# Patient Record
Sex: FEMALE | Race: WHITE | NOT HISPANIC OR LATINO | Employment: OTHER | URBAN - METROPOLITAN AREA
[De-identification: names, ages, dates, MRNs, and addresses within clinical notes are randomized per-mention and may not be internally consistent; named-entity substitution may affect disease eponyms.]

---

## 2017-03-28 DIAGNOSIS — J45.40 MODERATE PERSISTENT ASTHMA WITHOUT COMPLICATION: ICD-10-CM

## 2017-03-28 RX ORDER — FLUTICASONE PROPIONATE 220 UG/1
AEROSOL, METERED RESPIRATORY (INHALATION)
Qty: 12 G | Refills: 6 | Status: SHIPPED | OUTPATIENT
Start: 2017-03-28 | End: 2017-10-16 | Stop reason: SDUPTHER

## 2017-04-25 DIAGNOSIS — E87.6 HYPOKALEMIA: ICD-10-CM

## 2017-04-25 RX ORDER — POTASSIUM CHLORIDE 750 MG/1
CAPSULE, EXTENDED RELEASE ORAL
Qty: 60 CAPSULE | Refills: 11 | Status: SHIPPED | OUTPATIENT
Start: 2017-04-25 | End: 2018-05-14 | Stop reason: SDUPTHER

## 2017-10-16 DIAGNOSIS — J45.40 MODERATE PERSISTENT ASTHMA WITHOUT COMPLICATION: ICD-10-CM

## 2017-10-16 RX ORDER — FLUTICASONE PROPIONATE 220 UG/1
AEROSOL, METERED RESPIRATORY (INHALATION)
Qty: 12 G | Refills: 6 | Status: SHIPPED | OUTPATIENT
Start: 2017-10-16 | End: 2018-02-12

## 2018-01-05 DIAGNOSIS — R06.82 TACHYPNEA: Primary | ICD-10-CM

## 2018-01-05 DIAGNOSIS — I27.9 CHRONIC PULMONARY HEART DISEASE: ICD-10-CM

## 2018-01-05 DIAGNOSIS — Z79.899 POLYPHARMACY: ICD-10-CM

## 2018-01-09 DIAGNOSIS — R05.9 COUGH: Primary | ICD-10-CM

## 2018-02-07 ENCOUNTER — HOSPITAL ENCOUNTER (OUTPATIENT)
Dept: PULMONOLOGY | Facility: CLINIC | Age: 63
Discharge: HOME | End: 2018-02-07
Payer: COMMERCIAL

## 2018-02-07 ENCOUNTER — HOSPITAL ENCOUNTER (OUTPATIENT)
Dept: RADIOLOGY | Facility: HOSPITAL | Age: 63
Discharge: HOME | End: 2018-02-07
Attending: INTERNAL MEDICINE
Payer: COMMERCIAL

## 2018-02-07 ENCOUNTER — OFFICE VISIT (OUTPATIENT)
Dept: PULMONOLOGY | Facility: CLINIC | Age: 63
End: 2018-02-07
Payer: COMMERCIAL

## 2018-02-07 ENCOUNTER — HOSPITAL ENCOUNTER (OUTPATIENT)
Dept: CARDIOLOGY | Facility: CLINIC | Age: 63
Discharge: HOME | End: 2018-02-07
Attending: INTERNAL MEDICINE
Payer: COMMERCIAL

## 2018-02-07 ENCOUNTER — OFFICE VISIT (OUTPATIENT)
Dept: TRANSPLANT | Facility: CLINIC | Age: 63
End: 2018-02-07
Payer: COMMERCIAL

## 2018-02-07 VITALS
WEIGHT: 139 LBS | SYSTOLIC BLOOD PRESSURE: 120 MMHG | DIASTOLIC BLOOD PRESSURE: 82 MMHG | OXYGEN SATURATION: 95 % | BODY MASS INDEX: 21.82 KG/M2 | HEIGHT: 67 IN | RESPIRATION RATE: 14 BRPM | HEART RATE: 96 BPM

## 2018-02-07 VITALS
DIASTOLIC BLOOD PRESSURE: 81 MMHG | SYSTOLIC BLOOD PRESSURE: 165 MMHG | OXYGEN SATURATION: 98 % | HEART RATE: 53 BPM | HEIGHT: 67 IN | BODY MASS INDEX: 21.87 KG/M2 | WEIGHT: 139.31 LBS

## 2018-02-07 DIAGNOSIS — J47.9 ADULT BRONCHIECTASIS: ICD-10-CM

## 2018-02-07 DIAGNOSIS — R06.02 SHORTNESS OF BREATH: ICD-10-CM

## 2018-02-07 DIAGNOSIS — I10 ESSENTIAL HYPERTENSION: ICD-10-CM

## 2018-02-07 DIAGNOSIS — A31.9 MYCOBACTERIA, ATYPICAL: ICD-10-CM

## 2018-02-07 DIAGNOSIS — R05.9 COUGH: ICD-10-CM

## 2018-02-07 DIAGNOSIS — R07.89 CHEST PAIN, ATYPICAL: Primary | ICD-10-CM

## 2018-02-07 DIAGNOSIS — J45.40 MODERATE PERSISTENT ASTHMA WITHOUT COMPLICATION: ICD-10-CM

## 2018-02-07 DIAGNOSIS — I27.9 CHRONIC PULMONARY HEART DISEASE: ICD-10-CM

## 2018-02-07 DIAGNOSIS — J47.9 ADULT BRONCHIECTASIS: Primary | ICD-10-CM

## 2018-02-07 LAB
DIASTOLIC DYSFUNCTION: NO
ESTIMATED PA SYSTOLIC PRESSURE: 28.2
PRE FEV1 FVC: 59
PRE FEV1: 1.53
PRE FVC: 2.58
PREDICTED FEV1 FVC: 79
PREDICTED FEV1: 2.54
PREDICTED FVC: 3.2
RETIRED EF AND QEF - SEE NOTES: 60 (ref 55–65)
TRICUSPID VALVE REGURGITATION: NORMAL

## 2018-02-07 PROCEDURE — 99214 OFFICE O/P EST MOD 30 MIN: CPT | Mod: 25,S$GLB,, | Performed by: INTERNAL MEDICINE

## 2018-02-07 PROCEDURE — 71046 X-RAY EXAM CHEST 2 VIEWS: CPT | Mod: 26,,, | Performed by: RADIOLOGY

## 2018-02-07 PROCEDURE — 99999 PR PBB SHADOW E&M-EST. PATIENT-LVL III: CPT | Mod: PBBFAC,,, | Performed by: INTERNAL MEDICINE

## 2018-02-07 PROCEDURE — 3008F BODY MASS INDEX DOCD: CPT | Mod: S$GLB,,, | Performed by: INTERNAL MEDICINE

## 2018-02-07 PROCEDURE — 71250 CT THORAX DX C-: CPT | Mod: TC

## 2018-02-07 PROCEDURE — 94010 BREATHING CAPACITY TEST: CPT | Mod: S$GLB,,, | Performed by: INTERNAL MEDICINE

## 2018-02-07 PROCEDURE — 71250 CT THORAX DX C-: CPT | Mod: 26,,, | Performed by: RADIOLOGY

## 2018-02-07 PROCEDURE — 71046 X-RAY EXAM CHEST 2 VIEWS: CPT | Mod: TC,FY

## 2018-02-07 PROCEDURE — 93306 TTE W/DOPPLER COMPLETE: CPT | Mod: ,,, | Performed by: INTERNAL MEDICINE

## 2018-02-07 PROCEDURE — 99213 OFFICE O/P EST LOW 20 MIN: CPT | Mod: S$GLB,,, | Performed by: INTERNAL MEDICINE

## 2018-02-07 NOTE — PATIENT INSTRUCTIONS
Low-Salt Diet  This diet removes foods that are high in salt. It also limits the amount of salt you use when cooking. It is most often used for people with high blood pressure, edema (fluid retention), and kidney, liver, or heart disease.  Table salt contains the mineral sodium. Your body needs sodium to work normally. But too much sodium can make your health problems worse. Your healthcare provider is recommending a low-salt (also called low-sodium) diet for you. Your total daily allowance of salt is 1,500 to 2,300 milligrams (mg). It is less than 1 teaspoon of table salt. This means you can have only about 500 to 700 mg of sodium at each meal. People with certain health problems should limit salt intake to the lower end of the recommended range.    When you cook, dont add much salt. If you can cook without using salt, even better. Dont add salt to your food at the table.  When shopping, read food labels. Salt is often called sodium on the label. Choose foods that are salt-free, low salt, or very low salt. Note that foods with reduced salt may not lower your salt intake enough.    Beans, potatoes, and pasta  Ok: Dry beans, split peas, lentils, potatoes, rice, macaroni, pasta, spaghetti without added salt  Avoid: Potato chips, tortilla chips, and similar products  Breads and cereals  Ok: Low-sodium breads, rolls, cereals, and cakes; low-salt crackers, matzo crackers  Avoid: Salted crackers, pretzels, popcorn, Czech toast, pancakes, muffins  Dairy  Ok: Milk, chocolate milk, hot chocolate mix, low-salt cheeses, and yogurt  Avoid: Processed cheese and cheese spreads; Roquefort, Camembert, and cottage cheese; buttermilk, instant breakfast drink  Desserts  Ok: Ice cream, frozen yogurt, juice bars, gelatin, cookies and pies, sugar, honey, jelly, hard candy  Avoid: Most pies, cakes and cookies prepared or processed with salt; instant pudding  Drinks  Ok: Tea, coffee, fizzy (carbonated) drinks, juices  Avoid: Flavored  coffees, electrolyte replacement drinks, sports drinks  Meats  Ok: All fresh meat, fish, poultry, low-salt tuna, eggs, egg substitute  Avoid: Smoked, pickled, brine-cured, or salted meats and fish. This includes pickering, chipped beef, corned beef, hot dogs, deli meats, ham, kosher meats, salt pork, sausage, canned tuna, salted codfish, smoked salmon, herring, sardines, or anchovies.  Seasonings and spices  Ok: Most seasonings are okay. Good substitutes for salt include: fresh herb blends, hot sauce, lemon, garlic, espinal, vinegar, dry mustard, parsley, cilantro, horseradish, tomato paste, regular margarine, mayonnaise, unsalted butter, cream cheese, vegetable oil, cream, low-salt salad dressing and gravy.  Avoid: Regular ketchup, relishes, pickles, soy sauce, teriyaki sauce, Worcestershire sauce, BBQ sauce, tartar sauce, meat tenderizer, chili sauce, regular gravy, regular salad dressing, salted butter  Soups  Ok: Low-salt soups and broths made with allowed foods  Avoid: Bouillon cubes, soups with smoked or salted meats, regular soup and broth  Vegetables  Ok: Most vegetables are okay; also low-salt tomato and vegetable juices  Avoid: Sauerkraut and other brine-soaked vegetables; pickles and other pickled vegetables; tomato juice, olives  Date Last Reviewed: 8/1/2016 © 2000-2017 [employer]. [address] [place] [id number]. All rights reserved. This information is not intended as a substitute for professional medical care. Always follow your healthcare professional's instructions.        Established High Blood Pressure    High blood pressure (hypertension) is a chronic disease. Often, healthcare providers dont know what causes it. But it can be caused by certain health conditions and medicines.  If you have high blood pressure, you may not have any symptoms. If you do have symptoms, they may include headache, dizziness, changes in your vision, chest pain, and shortness of breath. But even  without symptoms, high blood pressure thats not treated raises your risk for heart attack and stroke. High blood pressure is a serious health risk and shouldnt be ignored.  A blood pressure reading is made up of two numbers: a higher number over a lower number. The top number is the systolic pressure. The bottom number is the diastolic pressure. A normal blood pressure is a systolic pressure of  less than 120 over a diastolic pressure of less than 80. You will see your blood pressure readings written together. For example, a person with a systolic pressure of 188 and a diastolic pressure of 78 will have 118/78 written in the medical record.  High blood pressure is when either the top number is 140 or higher, or the bottom number is 90 or higher. This must be the result when taking your blood pressure a number of times. The blood pressures between normal and high are called prehypertension.  Home care  If you have high blood pressure, you should do what is listed below to lower your blood pressure. If you are taking medicines for high blood pressure, these methods may reduce or end your need for medicines in the future.  · Begin a weight-loss program if you are overweight.  · Cut back on how much salt you get in your diet. Heres how to do this:  ¨ Dont eat foods that have a lot of salt. These include olives, pickles, smoked meats, and salted potato chips.  ¨ Dont add salt to your food at the table.  ¨ Use only small amounts of salt when cooking.  · Start an exercise program. Talk with your healthcare provider about the type of exercise program that would be best for you. It doesn't have to be hard. Even brisk walking for 20 minutes 3 times a week is a good form of exercise.  · Dont take medicines that stimulate the heart. This includes many over-the-counter cold and sinus decongestant pills and sprays, as well as diet pills. Check the warnings about hypertension on the label. Before buying any over-the-counter  medicines or supplements, always ask the pharmacist about the product's potential interaction with your high blood pressure and your high blood pressure medicines.  · Stimulants such as amphetamine or cocaine could be deadly for someone with high blood pressure. Never take these.  · Limit how much caffeine you get in your diet. Switch to caffeine-free products.  · Stop smoking. If you are a long-time smoker, this can be hard. Talk to your healthcare provider about medicines and nicotine replacement options to help you. Also, enroll in a stop-smoking program to make it more likely that you will quit for good.  · Learn how to handle stress. This is an important part of any program to lower blood pressure. Learn about relaxation methods like meditation, yoga, or biofeedback.  · If your provider prescribed medicines, take them exactly as directed. Missing doses may cause your blood pressure get out of control.  · If you miss a dose or doses, check with your healthcare provider or pharmacist about what to do.  · Consider buying an automatic blood pressure machine. Ask your provider for a recommendation. You can get one of these at most pharmacies.     The American Heart Association recommends the following guidelines for home blood pressure monitoring:  · Don't smoke or drink coffee for 30 minutes before taking your blood pressure.  · Go to the bathroom before the test.  · Relax for 5 minutes before taking the measurement.  · Sit with your back supported (don't sit on a couch or soft chair); keep your feet on the floor uncrossed. Place your arm on a solid flat surface (like a table) with the upper part of the arm at heart level. Place the middle of the cuff directly above the eye of the elbow. Check the monitor's instruction manual for an illustration.  · Take multiple readings. When you measure, take 2 to 3 readings one minute apart and record all of the results.  · Take your blood pressure at the same time every day,  or as your healthcare provider recommends.  · Record the date, time, and blood pressure reading.  · Take the record with you to your next medical appointment. If your blood pressure monitor has a built-in memory, simply take the monitor with you to your next appointment.  · Call your provider if you have several high readings. Don't be frightened by a single high blood pressure reading, but if you get several high readings, check in with your healthcare provider.  · Note: When blood pressure reaches a systolic (top number) of 180 or higher OR diastolic (bottom number) of 110 or higher, seek emergency medical treatment.  Follow-up care  You will need to see your healthcare provider regularly. This is to check your blood pressure and to make changes to your medicines. Make a follow-up appointment as directed. Bring the record of your home blood pressure readings to the appointment.  When to seek medical advice  Call your healthcare provider right away if any of these occur:  · Blood pressure reaches a systolic (upper number) of 180 or higher OR a diastolic (bottom number) of 110 or higher  · Chest pain or shortness of breath  · Severe headache  · Throbbing or rushing sound in the ears  · Nosebleed  · Sudden severe pain in your belly (abdomen)  · Extreme drowsiness, confusion, or fainting  · Dizziness or spinning sensation (vertigo)  · Weakness of an arm or leg or one side of the face  · You have problems speaking or seeing   Date Last Reviewed: 12/1/2016  © 1049-8813 [employer]. [address] [place] [address], [place] [address]. All rights reserved. This information is not intended as a substitute for professional medical care. Always follow your healthcare professional's instructions.

## 2018-02-07 NOTE — PROGRESS NOTES
"Subjective:    Patient ID:  Carley Katz is a 62 y.o. female who presents for evaluation of Pulmonary Hypertension.    HPI     63 yo WF with h/o HTN, h/o mycobacterium gordonae, PFTs suggestive of asthma, self referred  For eval of PH which on review of data pt does not appear to have and no follow up was recommended other than with pulmonary who feels she has an obstructive component (Dr Noe).    Since last visit, pt has had multiple respiratory infections one after another and is here to try to figure out why she cant stay well. Also saw Dr Noe today who plans to run some tests.  Says from a heart standpoint, feels her heart "flop" when she gets tired, and still struggles with SOB- happens anytime- walking, vacuuming, doesn't take much activity. Retains fluid in her legs in the afternoons and in her hands- goes down overnight  Takes her fluid pill every afternoon which makes her urinate a lot.  (HCTZ 25mg)    Her PCP is Dr Benny Garland and she says her bp has been doing pretty good- 120//  Sometimes it does go a little higher, especially if she is doing something, notes it goes up when she is SOB (checks it at home and sees it go up)        6cs055.5  m (   m in    )                                              O2 sat  97 ->92  %                                                           HR   64-> 78                                                                 BP  145 /67   -> 174 / 74                                                        [place]  3   -> 5-6    TTE today   1 - Normal left ventricular systolic function (EF 60-65%).     2 - Mild left atrial enlargement.     3 - Normal left ventricular diastolic function.     4 - Normal right ventricular systolic function .     5 - Mild tricuspid regurgitation.     6 - The estimated PA systolic pressure is 28 mmHg.   The right ventricle is normal in size measuring 3.5 cm at the base in the apical right ventricle-focused view. Global right ventricular " "systolic function appears normal. Tricuspid annular plane systolic excursion (TAPSE) is 2.6 cm.   Tissue Doppler-derived tricuspid annular peak systolic velocity (S prime) is 13.9 cm/s. The estimated PA systolic pressure is 28 mmHg        Echo 2016  1 - Normal left ventricular systolic function (EF 60-65%).   2 - Normal left ventricular diastolic function.   3 - Normal right ventricular systolic function .   4 - The estimated PA systolic pressure is 23 mmHg.   5 - Mild tricuspid regurgitation.     Review  Outside records  9/10/15     Ct chest 7/15 no infiltrates or nodules      CT Chest  1/24/142 /7/14  5/15/14   3 nonsolid densities RML, RLL  more suggestive of infiltrate than malignancy-> lung bx with mycobacterium gordonae    CT chest July 2015  No pulm infiltrates or nodules (interval resolution)      Review of Systems   Constitution: Negative for chills, fever, malaise/fatigue and weight gain.   HENT: Negative.    Eyes: Negative.    Cardiovascular: Positive for dyspnea on exertion, leg swelling and syncope. Negative for chest pain, near-syncope, orthopnea, palpitations and paroxysmal nocturnal dyspnea.   Respiratory: Positive for cough. Negative for shortness of breath.    Endocrine: Negative.    Skin: Negative.    Musculoskeletal: Negative.    Gastrointestinal: Positive for bloating. Negative for abdominal pain and change in bowel habit.   Neurological: Negative for dizziness and light-headedness.   Psychiatric/Behavioral: Negative for depression.        Objective:  BP (!) 165/81   Pulse (!) 53   Ht 5' 6.5" (1.689 m)   Wt 63.2 kg (139 lb 5.3 oz)   SpO2 98%   BMI 22.15 kg/m²       Physical Exam   Constitutional: She is oriented to person, place, and time. She appears well-developed and well-nourished.   HENT:   Head: Normocephalic and atraumatic.   Eyes: Right eye exhibits no discharge. Left eye exhibits no discharge.   Neck: Neck supple. No JVD present. No thyromegaly present.   Cardiovascular: " Normal rate and regular rhythm.  Exam reveals no gallop and no friction rub.    No murmur heard.       Pulmonary/Chest: Effort normal and breath sounds normal. No respiratory distress. She has no wheezes. She has no rales.   Abdominal: Soft. Bowel sounds are normal. She exhibits no distension. There is no tenderness.   Musculoskeletal: Normal range of motion. She exhibits no edema or tenderness.   Neurological: She is alert and oriented to person, place, and time. No cranial nerve deficit. Coordination normal.   Skin: Skin is warm and dry. No rash noted.   Psychiatric: She has a normal mood and affect. Judgment and thought content normal.           Chemistry        Component Value Date/Time     02/07/2018 1405    K 2.8 (L) 02/07/2018 1405    CL 99 02/07/2018 1405    CO2 34 (H) 02/07/2018 1405    BUN 17 02/07/2018 1405    CREATININE 0.7 02/07/2018 1405    GLU 99 02/07/2018 1405        Component Value Date/Time    CALCIUM 9.2 02/07/2018 1405    ALKPHOS 39 (L) 02/07/2018 1405    AST 15 02/07/2018 1405    ALT 11 02/07/2018 1405    BILITOT 0.3 02/07/2018 1405            No results found for: MG    Lab Results   Component Value Date    WBC 7.24 02/07/2018    HGB 11.7 (L) 02/07/2018    HCT 35.3 (L) 02/07/2018    MCV 90 02/07/2018     02/07/2018       No results found for: INR, PROTIME    BNP   Date Value Ref Range Status   02/07/2018 61 0 - 99 pg/mL Final     Comment:     Values of less than 100 pg/ml are consistent with non-CHF populations.   03/11/2016 23 0 - 99 pg/mL Final     Comment:     Values of less than 100 pg/ml are consistent with non-CHF populations.   01/26/2016 31 0 - 99 pg/mL Final     Comment:     Values of less than 100 pg/ml are consistent with non-CHF populations.       No results found for: LDH          Assessment:       1. Shortness of breath- Echo again today with normal PAP, normal RV and LV size and function, DOES have mild LAE which is usually the first sign of diastolic dysfxn and her  bp is not well controlled today- also reports bp shoots up with exercise  BNP is normal, euvolemic on exam.    2. Cough    3. Essential hypertension-  Not controlled   4. Chest pain, atypical    5. palps    Plan:         Asked pt to talk to her PCP about her BP.  Talked about diastolic dysfxn today and need for low Na diet, fluid restriction, and diuretics.  Might also benefit from a sleep study to R/O ONEIL though she does not have the typical body habitus for pt with ONEIL ( I have been surprised in past with which pts have tested positive for ONEIL)    Will see pt back on prn basis if she starts to develop signs/sx of HF which her local physician is unable to manage

## 2018-02-08 ENCOUNTER — PATIENT MESSAGE (OUTPATIENT)
Dept: PULMONOLOGY | Facility: CLINIC | Age: 63
End: 2018-02-08

## 2018-02-08 NOTE — PROGRESS NOTES
Subjective:       Patient ID: Carley Katz is a 62 y.o. female.    Chief Complaint: Asthma and Shortness of Breath    HPI Mrs. Katz is a 62-year-old nonsmoker who comes for assessment of   recurrent respiratory problems.  She had a previous visit here in 2016.  At that   time, she had studies to investigate a chronic cough and shortness of breath.    A CT scan of her chest showed evidence for localized bronchiectasis within the   right middle lobe.  Her pulmonary function studies showed obstruction without a   clear response to bronchodilators.  The diffusion capacity was within the range   of normal.  Based on these results, Mrs. Katz began a trial with Flovent   and also continued to use an albuterol inhaler.    Today, she reports that during these past 2 years, she has had recurrent   episodes of increased cough, sputum production, and shortness of breath.  She has   been treated by her physicians at home with various antibiotics, which seem to   provide temporary improvement.  Unfortunately, her respiratory symptoms soon   return.  She has not had any associated pleuritic pain or hemoptysis.  She no   longer uses albuterol due to a problem with muscle cramps.  She remains on   Flovent 220 two puffs twice daily.  She had a previous trial with Spiriva, but   did not find this beneficial.    Mrs. Katz was previously seen in the [facility] for assessment of   possible pulmonary hypertension.  She has a followup visit in that clinic later   today.    DATA:  I have reviewed the images from a chest x-ray done earlier today.  The   cardiac silhouette does not appear enlarged.  There are no pleural   abnormalities.  The lungs appear clear.  This radiograph appears unchanged in   comparison to a prior x-ray here from January 2016.  Mrs. Katz brought a   disk with images from recent chest x-rays done by her physicians at home.  I am   unable to open these on the desktop computer, but we will  have them scanned into   the Radiology files to allow review.    A spirometry study done today shows the forced vital capacity is 2.58 L, which   is 81% of predicted.  The FEV1 is 1.53 L, or 60% of predicted.  The ratio of   these values is 59%.  When today's spirometry values are compared to the   previous study from January 2016, there does not appear to have been any   definite interval change.  The current study once again demonstrates obstruction   with moderate ventilatory impairment.      CB/IN  dd: 02/07/2018 20:45:40 (CST)  td: 02/08/2018 17:03:21 (CST)  Doc ID   #[id number]  Job ID #[id number]    CC:       Review of Systems   Constitutional: Positive for fatigue. Negative for fever.   HENT: Positive for postnasal drip, sinus pressure and congestion. Negative for voice change.    Respiratory: Positive for cough, shortness of breath and dyspnea on extertion. Negative for sputum production and wheezing.    Cardiovascular: Negative for chest pain and leg swelling.   Genitourinary: Negative for difficulty urinating.   Musculoskeletal: Negative for arthralgias and back pain.   Skin: Negative for rash.   Gastrointestinal: Negative for abdominal pain and acid reflux.   Neurological: Negative for dizziness and weakness.   Hematological: Negative for adenopathy.       Objective:      Physical Exam   Constitutional: She is oriented to person, place, and time. She appears well-developed and well-nourished.   HENT:   Head: Normocephalic.   Nose: Nose normal.   Mouth/Throat: No oropharyngeal exudate.   Neck: Normal range of motion. No JVD present. No tracheal deviation present. No thyromegaly present.   Cardiovascular: Normal rate, regular rhythm and normal heart sounds.    No murmur heard.  Pulmonary/Chest: Symmetric chest wall expansion. No stridor. She has no wheezes. She has no rhonchi. She has no rales. She exhibits no tenderness.   Abdominal: Soft. There is no tenderness.   Musculoskeletal: She exhibits no edema.    Lymphadenopathy:     She has no cervical adenopathy.   Neurological: She is alert and oriented to person, place, and time.   Skin: Skin is warm and dry. No rash noted. No erythema. Nails show no clubbing.   Psychiatric: She has a normal mood and affect.   Vitals reviewed.    Personal Diagnostic Review    No flowsheet data found.      Assessment:       1. Adult bronchiectasis    2. Moderate persistent asthma without complication    3. Shortness of breath        Outpatient Encounter Prescriptions as of 2/7/2018   Medication Sig Dispense Refill    alprazolam (XANAX) 0.25 MG tablet Take 0.25 mg by mouth 3 (three) times daily as needed.  5    estrogens, conjugated, (PREMARIN) 0.625 MG tablet 0.625 mg.      FLOVENT  mcg/actuation inhaler Inhale 2 puffs into the lungs 2 (two) times daily. 12 g 6    fluconazole (DIFLUCAN) 150 MG Tab   0    fluticasone (FLONASE) 50 mcg/actuation nasal spray   2    hydrochlorothiazide (HYDRODIURIL) 25 MG tablet 25 mg.      hydrOXYzine pamoate (VISTARIL) 25 MG Cap 25 mg.      ibuprofen (ADVIL,MOTRIN) 800 MG tablet 800 mg.      losartan (COZAAR) 100 MG tablet Take 100 mg by mouth once daily.       omeprazole (PRILOSEC) 40 MG capsule Take 40 mg by mouth every morning.       potassium chloride (MICRO-K) 10 MEQ CpSR TAKE 2 CAPSULES BY MOUTH ONCE A DAY 60 capsule 11    [DISCONTINUED] albuterol 90 mcg/actuation inhaler 2 puffs.      [DISCONTINUED] amoxicillin (AMOXIL) 250 MG capsule   0    [DISCONTINUED] hydrochlorothiazide (HYDRODIURIL) 25 MG tablet   11    [DISCONTINUED] ibuprofen (ADVIL,MOTRIN) 800 MG tablet   0    [DISCONTINUED] losartan (COZAAR) 100 MG tablet   11    [DISCONTINUED] omeprazole (PRILOSEC) 20 MG capsule 20 mg.      [DISCONTINUED] omeprazole (PRILOSEC) 20 MG capsule   0    [DISCONTINUED] PREMARIN 0.625 mg tablet Take 0.625 mg by mouth once daily.  0    [DISCONTINUED] tiotropium (SPIRIVA) 18 mcg inhalation capsule 18 mcg.      [DISCONTINUED] venlafaxine  (EFFEXOR-XR) 37.5 MG 24 hr capsule   5    [DISCONTINUED] VENTOLIN HFA 90 mcg/actuation inhaler 2 puffs every 6 (six) hours as needed. As needed for wheezing  3     No facility-administered encounter medications on file as of 2/7/2018.      Orders Placed This Encounter   Procedures    CT Chest Without Contrast     Standing Status:   Future     Number of Occurrences:   1     Standing Expiration Date:   2/7/2019    CBC auto differential     Standing Status:   Future     Standing Expiration Date:   2/7/2019    Immunoglobulins (IgG, IgA, IgM) Quantitative     Standing Status:   Future     Number of Occurrences:   1     Standing Expiration Date:   2/7/2019    IgE     Standing Status:   Future     Number of Occurrences:   1     Standing Expiration Date:   2/7/2019     Plan:     CBC, Quant immunoglobulin assay, IgE, and CT Chest.  Review outside radiographs.  Phone review after above results available, and decide on further studies or change in therapy.

## 2018-02-12 ENCOUNTER — TELEPHONE (OUTPATIENT)
Dept: PULMONOLOGY | Facility: CLINIC | Age: 63
End: 2018-02-12

## 2018-02-12 DIAGNOSIS — R91.8 GROUND GLASS OPACITY PRESENT ON IMAGING OF LUNG: ICD-10-CM

## 2018-02-12 DIAGNOSIS — J47.9 ADULT BRONCHIECTASIS: Primary | ICD-10-CM

## 2018-02-12 DIAGNOSIS — R91.1 LUNG NODULE: ICD-10-CM

## 2018-02-12 RX ORDER — FLUTICASONE PROPIONATE AND SALMETEROL 250; 50 UG/1; UG/1
1 POWDER RESPIRATORY (INHALATION) 2 TIMES DAILY
Qty: 60 EACH | Refills: 6 | Status: SHIPPED | OUTPATIENT
Start: 2018-02-12 | End: 2018-07-12 | Stop reason: SDUPTHER

## 2018-02-12 NOTE — TELEPHONE ENCOUNTER
Pt informed that lab studies do not show evidence for immunoglobulin deficiency.  IgE not elevated.  Spirometry shows obstruction, with values similar to those seen in prior study.  CT shows evidence again for mild bronchiectasis involving RML, RLL, and LLL.  Also noted is a GGO in RML which is probably stable compared to prior scan from 2016.    I am asking her to begin trial with Advair 250/50 twice daily as alt to Flovent.  She will watch for any cramps, since she had this problem with Albuterol.  Discussed role for antibiotic if she has flare of chest congestion.  Will plan to monitor GGO with repeat CT Chest in 1 year.

## 2018-02-20 ENCOUNTER — PATIENT MESSAGE (OUTPATIENT)
Dept: PULMONOLOGY | Facility: CLINIC | Age: 63
End: 2018-02-20

## 2018-02-26 ENCOUNTER — TELEPHONE (OUTPATIENT)
Dept: PULMONOLOGY | Facility: CLINIC | Age: 63
End: 2018-02-26

## 2018-02-26 RX ORDER — LOSARTAN POTASSIUM 100 MG/1
100 TABLET ORAL
COMMUNITY
Start: 2015-09-28 | End: 2019-03-29 | Stop reason: SDUPTHER

## 2018-02-26 RX ORDER — IBUPROFEN 800 MG/1
800 TABLET ORAL
COMMUNITY
End: 2019-03-29 | Stop reason: SDUPTHER

## 2018-02-26 RX ORDER — TIOTROPIUM BROMIDE 18 UG/1
18 CAPSULE ORAL; RESPIRATORY (INHALATION)
COMMUNITY
End: 2019-03-29

## 2018-02-26 RX ORDER — HYDROCHLOROTHIAZIDE 25 MG/1
25 TABLET ORAL
COMMUNITY
Start: 2015-09-28

## 2018-02-26 RX ORDER — HYDROXYZINE PAMOATE 25 MG/1
25 CAPSULE ORAL
COMMUNITY
Start: 2015-08-04 | End: 2019-03-29 | Stop reason: SDUPTHER

## 2018-02-26 RX ORDER — FLUTICASONE PROPIONATE 110 UG/1
AEROSOL, METERED RESPIRATORY (INHALATION)
COMMUNITY
End: 2019-07-26 | Stop reason: SDUPTHER

## 2018-02-26 RX ORDER — OMEPRAZOLE 20 MG/1
20 CAPSULE, DELAYED RELEASE ORAL
COMMUNITY
Start: 2015-09-28 | End: 2019-03-29

## 2018-05-14 DIAGNOSIS — E87.6 HYPOKALEMIA: ICD-10-CM

## 2018-05-14 RX ORDER — POTASSIUM CHLORIDE 750 MG/1
CAPSULE, EXTENDED RELEASE ORAL
Qty: 60 CAPSULE | Refills: 11 | Status: SHIPPED | OUTPATIENT
Start: 2018-05-14

## 2018-07-12 ENCOUNTER — PATIENT MESSAGE (OUTPATIENT)
Dept: PULMONOLOGY | Facility: CLINIC | Age: 63
End: 2018-07-12

## 2018-07-12 DIAGNOSIS — J47.9 ADULT BRONCHIECTASIS: ICD-10-CM

## 2018-07-12 RX ORDER — FLUTICASONE PROPIONATE AND SALMETEROL 250; 50 UG/1; UG/1
1 POWDER RESPIRATORY (INHALATION) 2 TIMES DAILY
Qty: 60 EACH | Refills: 6 | Status: SHIPPED | OUTPATIENT
Start: 2018-07-12 | End: 2019-07-25

## 2018-12-20 DIAGNOSIS — Z12.31 ENCOUNTER FOR SCREENING MAMMOGRAM FOR MALIGNANT NEOPLASM OF BREAST: Primary | ICD-10-CM

## 2019-01-07 ENCOUNTER — TELEPHONE (OUTPATIENT)
Dept: PULMONOLOGY | Facility: CLINIC | Age: 64
End: 2019-01-07

## 2019-01-07 NOTE — TELEPHONE ENCOUNTER
----- Message from Samantha Mata sent at 1/7/2019  8:07 AM CST -----  Contact: Self  .Patient Requesting Sooner Appointment.     Reason for sooner appt.: Appts same day- 2/6 - am  When is the first available appointment? 1/23  Communication Preference: [phone]  Additional Information: Travels far asking if can schedule appt on 2/6.

## 2019-01-07 NOTE — TELEPHONE ENCOUNTER
Patient would like to be seen by Dr. Lopez on 02/06 since she has appointments scheduled on that day. Informed her that Dr. Lopez schedule have not been opened up for February 2019 and that there aren't any appointments that I can offer her at this time. Patient is scheduled to see Dr. Lopez on 01/23 @3:30p. Patient states she will keep this appointment that she have and call in as it gets closer to see if he has anything in February.

## 2019-02-16 ENCOUNTER — PATIENT MESSAGE (OUTPATIENT)
Dept: PULMONOLOGY | Facility: CLINIC | Age: 64
End: 2019-02-16

## 2019-03-19 DIAGNOSIS — R06.02 SHORTNESS OF BREATH: Primary | ICD-10-CM

## 2019-03-27 NOTE — PROGRESS NOTES
CHIEF COMPLAINT:    Mrs. Katz is a 63 y.o. female presenting as a self referred patient.  Incomplete bladder emptying, feels like the urethra is closing    PRESENTING ILLNESS:    Carley Katz is a 63 y.o. female who states that she feels like her urethra is closing.  Sometimes the stream is slow and sprays in different directions.  She has not had any urethral surgery (hysterectomy only) and no XRT to the pelvis.  She also has suprapubic tenderness, pressure that is not exacerbated by anything but she states that Tylenol sometimes relieves it.  She urinates several times in the am and before she takes her HCTZ but after that she voids 6-8 times before bedtime.  She also has nocturia x 3-4.  She states her urinary symptoms started about 1 1/2 years ago after she had a Qtip evaluation at [facility] by her OBGYN.    , hysterectomy for bleeding 10-15 years ago, sexually active no pain, bowels are normal.     REVIEW OF SYSTEMS:    Review of Systems   Constitutional: Negative.    HENT: Negative.    Eyes: Negative.    Respiratory: Positive for cough.    Cardiovascular: Negative.    Gastrointestinal: Negative.    Genitourinary: Positive for frequency and urgency.        Slow urinary stream   Musculoskeletal: Negative.    Skin: Negative.    Neurological: Negative.    Endo/Heme/Allergies: Negative.    Psychiatric/Behavioral: Negative.        PATIENT HISTORY:    Past Medical History:   Diagnosis Date    Hypertension     Lung disease        Past Surgical History:   Procedure Laterality Date    HYSTERECTOMY  2004    LUNG BIOPSY      OOPHORECTOMY         Family History   Problem Relation Age of Onset    Lung cancer Father     COPD Mother     Hypertension Mother     Atrial fibrillation Mother     Breast cancer Sister      Socioeconomic History    Marital status:    Tobacco Use    Smoking status: Never Smoker    Smokeless tobacco: Never Used    Tobacco comment: Father smoked   Substance and Sexual  Activity    Alcohol use: Not Currently     Alcohol/week: 0.0 oz    Drug use: Never    Sexual activity: Yes     Partners: Male     Birth control/protection: None       Allergies:  Albuterol; Ciprofloxacin; Codeine; Ciprofloxacin (bulk); Influenza virus vaccines; Latex, natural rubber; and Peanut    Medications:  Outpatient Encounter Medications as of 3/29/2019   Medication Sig Dispense Refill    estrogens, conjugated, (PREMARIN) 0.625 MG tablet Take 0.625 mg by mouth.      fluconazole (DIFLUCAN) 150 MG Tab   0    fluticasone (FLONASE) 50 mcg/actuation nasal spray   2    fluticasone (FLOVENT HFA) 110 mcg/actuation inhaler Inhale into the lungs.      hydroCHLOROthiazide (HYDRODIURIL) 25 MG tablet Take 25 mg by mouth.      hydrOXYzine pamoate (VISTARIL) 25 MG Cap 25 mg.      ibuprofen (ADVIL,MOTRIN) 800 MG tablet 800 mg.      losartan (COZAAR) 100 MG tablet Take 100 mg by mouth once daily.       omeprazole (PRILOSEC) 40 MG capsule Take 40 mg by mouth every morning.       potassium chloride (MICRO-K) 10 MEQ CpSR TAKE 2 CAPSULES BY MOUTH ONCE A DAY 60 capsule 11         PHYSICAL EXAMINATION:    The patient generally appears in good health, is appropriately interactive, and is in no apparent distress.    Skin: No lesions.    Mental: Cooperative with normal affect.    Neuro: Grossly intact.    HEENT: Normal. No evidence of lymphadenopathy.    Chest:  normal inspiratory effort.    Abdomen:  Soft, non-tender. No masses or organomegaly. Bladder is not palpable. No evidence of flank discomfort. No evidence of inguinal hernia.    Extremities: No clubbing, cyanosis, or edema    Normal external female genitalia  Urethral meatus is normal  Urethra and bladder are nontender to bimanual exam  Well supported anteriorly and posteriorly   Uterus and cervix are surgically absent  No adnexal masses  PVR by catheterization was 20 ml    LABS:    Lab Results   Component Value Date    BUN 17 02/07/2018    CREATININE 0.7  02/07/2018     UA 1.020, pH 5, otherwise, negative    IMPRESSION:    Encounter Diagnoses   Name Primary?    Slow urinary stream Yes       PLAN:    1.  Cystoscopy  2.  The catheterized specimen was sent for culture  3.  She had to leave for a pulmonology appointment (had scheduled this appointment 10 minutes before her pulm appointment)  4.  If the cystoscopy is normal, may need urodynamics to further characterize symptoms.  ? Underactive bladder.

## 2019-03-29 ENCOUNTER — OFFICE VISIT (OUTPATIENT)
Dept: UROLOGY | Facility: CLINIC | Age: 64
End: 2019-03-29
Payer: COMMERCIAL

## 2019-03-29 ENCOUNTER — OFFICE VISIT (OUTPATIENT)
Dept: PULMONOLOGY | Facility: CLINIC | Age: 64
End: 2019-03-29
Payer: COMMERCIAL

## 2019-03-29 ENCOUNTER — HOSPITAL ENCOUNTER (OUTPATIENT)
Dept: RADIOLOGY | Facility: HOSPITAL | Age: 64
Discharge: HOME | End: 2019-03-29
Payer: COMMERCIAL

## 2019-03-29 ENCOUNTER — HOSPITAL ENCOUNTER (OUTPATIENT)
Dept: PULMONOLOGY | Facility: CLINIC | Age: 64
Discharge: HOME | End: 2019-03-29
Payer: COMMERCIAL

## 2019-03-29 VITALS
OXYGEN SATURATION: 98 % | BODY MASS INDEX: 22.66 KG/M2 | HEIGHT: 66 IN | DIASTOLIC BLOOD PRESSURE: 72 MMHG | SYSTOLIC BLOOD PRESSURE: 127 MMHG | HEART RATE: 56 BPM | WEIGHT: 141 LBS

## 2019-03-29 VITALS
WEIGHT: 141.31 LBS | HEART RATE: 62 BPM | DIASTOLIC BLOOD PRESSURE: 73 MMHG | BODY MASS INDEX: 22.18 KG/M2 | HEIGHT: 67 IN | SYSTOLIC BLOOD PRESSURE: 139 MMHG

## 2019-03-29 DIAGNOSIS — N95.2 VAGINAL ATROPHY: ICD-10-CM

## 2019-03-29 DIAGNOSIS — J44.9 CHRONIC OBSTRUCTIVE PULMONARY DISEASE, UNSPECIFIED COPD TYPE: ICD-10-CM

## 2019-03-29 DIAGNOSIS — R06.02 SHORTNESS OF BREATH: ICD-10-CM

## 2019-03-29 DIAGNOSIS — J47.9 ADULT BRONCHIECTASIS: Primary | ICD-10-CM

## 2019-03-29 DIAGNOSIS — R39.198 SLOW URINARY STREAM: Primary | ICD-10-CM

## 2019-03-29 DIAGNOSIS — N64.4 BREAST PAIN, LEFT: ICD-10-CM

## 2019-03-29 DIAGNOSIS — K21.9 GASTROESOPHAGEAL REFLUX DISEASE, ESOPHAGITIS PRESENCE NOT SPECIFIED: ICD-10-CM

## 2019-03-29 DIAGNOSIS — N63.20 LUMP OF LEFT BREAST: ICD-10-CM

## 2019-03-29 LAB
PRE FEV1 FVC: 56
PRE FEV1: 1.43
PRE FVC: 2.55
PREDICTED FEV1 FVC: 78
PREDICTED FEV1: 2.52
PREDICTED FVC: 3.19

## 2019-03-29 PROCEDURE — 76642 ULTRASOUND BREAST LIMITED: CPT | Mod: 26,LT,, | Performed by: RADIOLOGY

## 2019-03-29 PROCEDURE — 77066 MAMMO DIGITAL DIAGNOSTIC BILAT WITH TOMOSYNTHESIS_CAD: ICD-10-PCS | Mod: 26,,, | Performed by: RADIOLOGY

## 2019-03-29 PROCEDURE — 77066 DX MAMMO INCL CAD BI: CPT | Mod: TC,PO

## 2019-03-29 PROCEDURE — 76642 US BREAST LEFT LIMITED: ICD-10-PCS | Mod: 26,LT,, | Performed by: RADIOLOGY

## 2019-03-29 PROCEDURE — 99205 OFFICE O/P NEW HI 60 MIN: CPT | Mod: 25,S$GLB,, | Performed by: UROLOGY

## 2019-03-29 PROCEDURE — 81002 URINALYSIS NONAUTO W/O SCOPE: CPT | Mod: S$GLB,,, | Performed by: UROLOGY

## 2019-03-29 PROCEDURE — 99205 PR OFFICE/OUTPT VISIT, NEW, LEVL V, 60-74 MIN: ICD-10-PCS | Mod: 25,S$GLB,, | Performed by: UROLOGY

## 2019-03-29 PROCEDURE — 99214 PR OFFICE/OUTPT VISIT, EST, LEVL IV, 30-39 MIN: ICD-10-PCS | Mod: S$GLB,,, | Performed by: HOSPITALIST

## 2019-03-29 PROCEDURE — 94010 BREATHING CAPACITY TEST: CPT | Mod: S$GLB,ICN,, | Performed by: INTERNAL MEDICINE

## 2019-03-29 PROCEDURE — 51701 INSERT BLADDER CATHETER: CPT | Mod: PBBFAC | Performed by: UROLOGY

## 2019-03-29 PROCEDURE — 51701 INSERT BLADDER CATHETER: CPT | Mod: S$GLB,,, | Performed by: UROLOGY

## 2019-03-29 PROCEDURE — 94010 BREATHING CAPACITY TEST: CPT | Mod: PBBFAC | Performed by: INTERNAL MEDICINE

## 2019-03-29 PROCEDURE — 76642 ULTRASOUND BREAST LIMITED: CPT | Mod: TC,PO,LT

## 2019-03-29 PROCEDURE — 87086 URINE CULTURE/COLONY COUNT: CPT

## 2019-03-29 PROCEDURE — 77066 DX MAMMO INCL CAD BI: CPT | Mod: 26,,, | Performed by: RADIOLOGY

## 2019-03-29 PROCEDURE — 51701 PR INSERTION OF NON-INDWELLING BLADDER CATHETERIZATION FOR RESIDUAL UR: ICD-10-PCS | Mod: S$GLB,,, | Performed by: UROLOGY

## 2019-03-29 PROCEDURE — 99214 OFFICE O/P EST MOD 30 MIN: CPT | Mod: S$GLB,,, | Performed by: HOSPITALIST

## 2019-03-29 PROCEDURE — 94729 DIFFUSING CAPACITY: CPT | Mod: 26,S$PBB,, | Performed by: INTERNAL MEDICINE

## 2019-03-29 PROCEDURE — 94729 PR C02/MEMBANE DIFFUSE CAPACITY: ICD-10-PCS | Mod: 26,S$PBB,, | Performed by: INTERNAL MEDICINE

## 2019-03-29 PROCEDURE — 94010 BREATHING CAPACITY TEST: ICD-10-PCS | Mod: S$GLB,ICN,, | Performed by: INTERNAL MEDICINE

## 2019-03-29 PROCEDURE — 99999 PR PBB SHADOW E&M-EST. PATIENT-LVL V: ICD-10-PCS | Mod: PBBFAC,,, | Performed by: HOSPITALIST

## 2019-03-29 PROCEDURE — 77062 MAMMO DIGITAL DIAGNOSTIC BILAT WITH TOMOSYNTHESIS_CAD: ICD-10-PCS | Mod: 26,,, | Performed by: RADIOLOGY

## 2019-03-29 PROCEDURE — 99999 PR PBB SHADOW E&M-EST. PATIENT-LVL IV: CPT | Mod: PBBFAC,,, | Performed by: UROLOGY

## 2019-03-29 PROCEDURE — 81002 PR URINALYSIS NONAUTO W/O SCOPE: ICD-10-PCS | Mod: S$GLB,,, | Performed by: UROLOGY

## 2019-03-29 PROCEDURE — 99999 PR PBB SHADOW E&M-EST. PATIENT-LVL V: CPT | Mod: PBBFAC,,, | Performed by: HOSPITALIST

## 2019-03-29 PROCEDURE — 99214 OFFICE O/P EST MOD 30 MIN: CPT | Mod: PBBFAC,25,27 | Performed by: UROLOGY

## 2019-03-29 PROCEDURE — 94729 DIFFUSING CAPACITY: CPT | Mod: PBBFAC | Performed by: INTERNAL MEDICINE

## 2019-03-29 PROCEDURE — 77062 BREAST TOMOSYNTHESIS BI: CPT | Mod: 26,,, | Performed by: RADIOLOGY

## 2019-03-29 PROCEDURE — 99215 OFFICE O/P EST HI 40 MIN: CPT | Mod: PBBFAC,25 | Performed by: HOSPITALIST

## 2019-03-29 PROCEDURE — 81002 URINALYSIS NONAUTO W/O SCOPE: CPT | Mod: PBBFAC | Performed by: UROLOGY

## 2019-03-29 PROCEDURE — 99999 PR PBB SHADOW E&M-EST. PATIENT-LVL IV: ICD-10-PCS | Mod: PBBFAC,,, | Performed by: UROLOGY

## 2019-03-29 RX ORDER — LISINOPRIL AND HYDROCHLOROTHIAZIDE 20; 25 MG/1; MG/1
1 TABLET ORAL
COMMUNITY
Start: 2013-06-06

## 2019-03-29 RX ORDER — AMOXICILLIN AND CLAVULANATE POTASSIUM 875; 125 MG/1; MG/1
TABLET, FILM COATED ORAL
COMMUNITY
Start: 2018-12-31 | End: 2019-09-09 | Stop reason: ALTCHOICE

## 2019-03-29 RX ORDER — LIDOCAINE HYDROCHLORIDE 20 MG/ML
JELLY TOPICAL ONCE
Status: CANCELLED | OUTPATIENT
Start: 2019-03-29 | End: 2019-03-29

## 2019-03-29 RX ORDER — MONTELUKAST SODIUM 10 MG/1
10 TABLET ORAL DAILY
Refills: 3 | COMMUNITY
Start: 2019-03-06

## 2019-03-29 RX ORDER — LORATADINE 10 MG/1
10 TABLET ORAL DAILY
Refills: 3 | COMMUNITY
Start: 2019-03-06

## 2019-03-29 RX ORDER — VALACYCLOVIR HYDROCHLORIDE 1 G/1
1000 TABLET, FILM COATED ORAL 2 TIMES DAILY
Refills: 3 | COMMUNITY
Start: 2019-03-06

## 2019-03-29 RX ORDER — CEFUROXIME AXETIL 500 MG/1
500 TABLET ORAL EVERY 12 HOURS
Refills: 0 | COMMUNITY
Start: 2019-01-21 | End: 2019-09-09 | Stop reason: ALTCHOICE

## 2019-03-29 RX ORDER — DOXYCYCLINE HYCLATE 100 MG
100 TABLET ORAL ONCE
Status: CANCELLED | OUTPATIENT
Start: 2019-03-29 | End: 2019-03-29

## 2019-03-29 RX ORDER — CETIRIZINE HYDROCHLORIDE 10 MG/1
TABLET ORAL
Refills: 0 | COMMUNITY
Start: 2019-03-11

## 2019-03-29 RX ORDER — METOPROLOL TARTRATE 25 MG/1
12.5 TABLET, FILM COATED ORAL
COMMUNITY

## 2019-03-29 RX ORDER — AMOXICILLIN 500 MG/1
CAPSULE ORAL
Refills: 0 | COMMUNITY
Start: 2019-03-11 | End: 2019-09-09 | Stop reason: ALTCHOICE

## 2019-03-29 RX ORDER — GABAPENTIN 300 MG/1
600 TABLET ORAL
COMMUNITY
Start: 2012-12-12

## 2019-03-29 RX ORDER — TRAZODONE HYDROCHLORIDE 50 MG/1
50 TABLET ORAL
COMMUNITY
Start: 2013-05-20

## 2019-03-29 NOTE — PATIENT INSTRUCTIONS
Cystoscopy    Cystoscopy is a procedure that lets your doctor look directly inside your urethra and bladder. It can be used to:  · Help diagnose a problem with your urethra, bladder, or kidneys.  · Take a sample (biopsy) of bladder or urethral tissue.  · Treat certain problems (such as removing kidney stones).  · Place a stent to bypass an obstruction.  · Take special X-rays of the kidneys.  Based on the findings, your doctor may recommend other tests or treatments.  What is a cystoscope?  A cystoscope is a telescope-like instrument that contains lenses and fiberoptics (small glass wires that make bright light). The cystoscope may be straight and rigid, or flexible to bend around curves in the urethra. The doctor may look directly into the cystoscope, or project the image onto a monitor.  Getting ready  · Ask your doctor if you should stop taking any medicines before the procedure.  · Ask whether you should avoid eating or drinking anything after midnight before the procedure.  · Follow any other instructions your doctor gives you.  Tell your doctor before the exam if you:  · Take any medicines, such as aspirin or blood thinners  · Have allergies to any medicines  · Are pregnant   The procedure  Cystoscopy is done in the doctors office, surgery center, or hospital. The doctor and a nurse are present during the procedure. It takes only a few minutes, longer if a biopsy, X-ray, or treatment needs to be done.  During the procedure:  · You lie on an exam table on your back, knees bent and legs apart. You are covered with a drape.  · Your urethra and the area around it are washed. Anesthetic jelly may be applied to numb the urethra. Other pain medicine is usually not needed. In some cases, you may be offered a mild sedative to help you relax. If a more extensive procedure is to be done, such as a biopsy or kidney stone removal, general anesthesia may be needed.  · The cystoscope is inserted. A sterile fluid is put  into the bladder to expand it. You may feel pressure from this fluid.  · When the procedure is done, the cystoscope is removed.  After the procedure  If you had a sedative, general anesthesia, or spinal anesthesia, you must have someone drive you home. Once youre home:  · Drink plenty of fluids.  · You may have burning or light bleeding when you urinate--this is normal.  · Medicines may be prescribed to ease any discomfort or prevent infection. Take these as directed.  · Call your doctor if you have heavy bleeding or blood clots, burning that lasts more than a day, a fever over 100°F  (38° C), or trouble urinating.  Date Last Reviewed: 1/1/2017  © 9123-0356 [employer]. [address] [place] [address], [place] [address]. All rights reserved. This information is not intended as a substitute for professional medical care. Always follow your healthcare professional's instructions.

## 2019-03-29 NOTE — PROGRESS NOTES
Subjective:       Patient ID: Carley Katz is a 63 y.o. female.    Chief Complaint: Bronchiectasis; Asthma; and Shortness of Breath    63yoF with hx of focal bronchiectasis presenting for follow up.  Patient was initially dx in 2014 when she was noted to have progressive dyspnea.  Had work up done at multiple facilities including a perc bx of a nodular lesion of the R lung base - cultures from his bx reportedly grew M. Gordonae. She has >10 exacerbations of her bronchiectasis per year.  These exacerbations are largely marked by significant increase in sputum quantity and chest congestion w/ change in sputum color to a green quality.  Has a cough most days with some sputum production (typically white in color).  She is on Flovent HFA.  Reports progressive dyspnea over these last few years.  Gets notably dyspneic with most activities (cleaning, climbing stairs).  Presently feels at baseline, only w/ normal sputum production.  Has notable GERD for which she takes per PPI every 2-3 days.  Notices some secretions in her throat with recumbent position.  No significant post nasal drip or sinus issues.    Asthma   She complains of cough and sputum production. There is no wheezing. Pertinent negatives include no chest pain, fever or trouble swallowing. Her past medical history is significant for asthma.     Review of Systems   Constitutional: Negative for fever, activity change, fatigue, night sweats and weakness.   HENT: Negative for sinus pressure, trouble swallowing, congestion and hearing loss.    Eyes: Negative for itching.   Respiratory: Positive for cough and sputum production. Negative for wheezing.    Cardiovascular: Negative for chest pain, palpitations and leg swelling.   Endocrine: Negative for cold intolerance and heat intolerance.    Musculoskeletal: Negative for arthralgias.   Gastrointestinal: Negative for nausea, vomiting and abdominal distention.   Neurological: Negative for syncope and light-headedness.    Hematological: Negative for adenopathy. Does not bruise/bleed easily.   Psychiatric/Behavioral: Negative for confusion.       Objective:      Physical Exam   Constitutional: She is oriented to person, place, and time. She appears well-developed and well-nourished. No distress.   HENT:   Head: Normocephalic.   Neck: Neck supple. No JVD present.   Cardiovascular: Normal rate and regular rhythm.   No murmur heard.  Pulmonary/Chest: Normal expansion. No stridor. No respiratory distress. She has no wheezes. She has no rhonchi. She has no rales.   Abdominal: Soft. She exhibits no distension.   Musculoskeletal: Normal range of motion. She exhibits no edema or deformity.   Neurological: She is alert and oriented to person, place, and time.   Skin: Skin is warm and dry. She is not diaphoretic. No erythema.   Psychiatric: She has a normal mood and affect.     Personal Diagnostic Review  Moderately severe obstruction.  DLCO normal.  Overall similar to study from 1 year prior.    No flowsheet data found.      Assessment:       1. Adult bronchiectasis    2. Chronic obstructive pulmonary disease, unspecified COPD type    3. Gastroesophageal reflux disease, esophagitis presence not specified        Outpatient Encounter Medications as of 3/29/2019   Medication Sig Dispense Refill    gabapentin 300 mg Tb24 Take 600 mg by mouth.      lisinopril-hydrochlorothiazide (PRINZIDE,ZESTORETIC) 20-25 mg Tab Take 1 tablet by mouth.      traZODone (DESYREL) 50 MG tablet Take 50 mg by mouth.      amoxicillin (AMOXIL) 500 MG capsule TAKE 1 CAPSULE BY MOUTH 3 TIMES DAILY FOR 10 DAYS  0    amoxicillin-clavulanate 875-125mg (AUGMENTIN) 875-125 mg per tablet       cefUROXime (CEFTIN) 500 MG tablet Take 500 mg by mouth every 12 (twelve) hours.  0    cetirizine (ZYRTEC) 10 MG tablet TAKE 1 TABLET BY MOUTH EACH DAY AS NEEDED  0    estrogens, conjugated, (PREMARIN) 0.625 MG tablet Take 0.625 mg by mouth.      fluconazole (DIFLUCAN) 150 MG Tab    0    fluticasone (FLONASE) 50 mcg/actuation nasal spray   2    fluticasone (FLOVENT HFA) 110 mcg/actuation inhaler Inhale into the lungs.      fluticasone-salmeterol 250-50 mcg/dose (ADVAIR) 250-50 mcg/dose diskus inhaler Inhale 1 puff into the lungs 2 (two) times daily. 60 each 6    hydroCHLOROthiazide (HYDRODIURIL) 25 MG tablet Take 25 mg by mouth.      hydrOXYzine pamoate (VISTARIL) 25 MG Cap 25 mg.      ibuprofen (ADVIL,MOTRIN) 800 MG tablet 800 mg.      loratadine (CLARITIN) 10 mg tablet Take 10 mg by mouth once daily.  3    losartan (COZAAR) 100 MG tablet Take 100 mg by mouth once daily.       metoprolol tartrate (LOPRESSOR) 25 MG tablet Take 12.5 mg by mouth.      montelukast (SINGULAIR) 10 mg tablet Take 10 mg by mouth once daily.  3    omeprazole (PRILOSEC) 40 MG capsule Take 40 mg by mouth every morning.       potassium chloride (MICRO-K) 10 MEQ CpSR TAKE 2 CAPSULES BY MOUTH ONCE A DAY 60 capsule 11    valACYclovir (VALTREX) 1000 MG tablet Take 1,000 mg by mouth 2 (two) times daily.  3    [DISCONTINUED] alprazolam (XANAX) 0.25 MG tablet Take 0.25 mg by mouth 3 (three) times daily as needed.  5    [DISCONTINUED] estrogens, conjugated, (PREMARIN) 0.625 MG tablet 0.625 mg.      [DISCONTINUED] hydrochlorothiazide (HYDRODIURIL) 25 MG tablet 25 mg.      [DISCONTINUED] hydrOXYzine pamoate (VISTARIL) 25 MG Cap Take 25 mg by mouth.      [DISCONTINUED] ibuprofen (ADVIL,MOTRIN) 800 MG tablet Take 800 mg by mouth.      [DISCONTINUED] losartan (COZAAR) 100 MG tablet Take 100 mg by mouth.      [DISCONTINUED] omeprazole (PRILOSEC) 20 MG capsule Take 20 mg by mouth.      [DISCONTINUED] tiotropium (SPIRIVA) 18 mcg inhalation capsule Inhale 18 mcg into the lungs.       No facility-administered encounter medications on file as of 3/29/2019.      No orders of the defined types were placed in this encounter.      Plan:       1. Uncertain etiology - IgE, Immunoglobulins negative.  Checking A1AT, has hx of  JOSE GUADALUPE. Kevin, will check sputum AFB when she is bringing up purulent sputum.  Pending clinical course w/ escalation of inhaler therapy and AFB culture results may need to consider additional therapeutic interventions given the frequency of her exacerbations.    2. Adding LABA/LAMA to ICS regimen.  Will ask PCP to refer to Pulmonary Rehab near her home.    3. Have asked her to take her PPI daily as poorly controlled reflux could be contributory.    Discussed w/ Dr. Lopez.    RTC in 3 months.    Fabián Hoyos MD  [facility]

## 2019-03-31 LAB — BACTERIA UR CULT: NO GROWTH

## 2019-04-01 ENCOUNTER — TELEPHONE (OUTPATIENT)
Dept: PULMONOLOGY | Facility: CLINIC | Age: 64
End: 2019-04-01

## 2019-04-01 NOTE — TELEPHONE ENCOUNTER
----- Message from Elvira Renae sent at 4/1/2019 12:08 PM CDT -----  Contact: self  Pt is calling in regards to getting a pre authorization code on her umeclidinium-vilanterol (ANORO ELLIPTA) 62.5-25 mcg/actuation DsDv.  Pt is using [employer]. [address].    Pt can be reached at [phone].    Thank you

## 2019-04-03 ENCOUNTER — TELEPHONE (OUTPATIENT)
Dept: PULMONOLOGY | Facility: CLINIC | Age: 64
End: 2019-04-03

## 2019-04-03 NOTE — TELEPHONE ENCOUNTER
----- Message from Mercedez Fuentes sent at 4/3/2019 12:08 PM CDT -----  Contact: [phone]  Pt is calling to request a refill  umeclidinium-vilanterol (ANORO ELLIPTA) 62.5-25 mcg/actuation DsDv  Pt is having problems getting medication refill. Please contact pharmacy medication was not filled even with an authorization.    [facility] [employer] - [place], [facility], [address]    [phone]      Thank you!  I returned Mrs Katz's phone call, her pharmacy told her the authorization had not gone thru despite [facility] giving me a Number-[id number].on 4-1-19. I called [facility] and was told the same thing, authorization pending. I received a phone call from someone at [facility] this afternoon saying the Anoro was denied and a appeal could be done, but she could not tell me how to do the appeal! I will attempt that tomorrow. Blanche Curtis LPN

## 2019-04-14 ENCOUNTER — PATIENT MESSAGE (OUTPATIENT)
Dept: PULMONOLOGY | Facility: CLINIC | Age: 64
End: 2019-04-14

## 2019-07-24 DIAGNOSIS — J45.40 MODERATE PERSISTENT ASTHMA WITHOUT COMPLICATION: ICD-10-CM

## 2019-07-25 ENCOUNTER — PATIENT MESSAGE (OUTPATIENT)
Dept: PULMONOLOGY | Facility: CLINIC | Age: 64
End: 2019-07-25

## 2019-07-26 DIAGNOSIS — J45.901 ASTHMA EXACERBATION IN COPD: Primary | ICD-10-CM

## 2019-07-26 DIAGNOSIS — J44.1 ASTHMA EXACERBATION IN COPD: Primary | ICD-10-CM

## 2019-07-26 RX ORDER — FLUTICASONE PROPIONATE 110 UG/1
2 AEROSOL, METERED RESPIRATORY (INHALATION) EVERY 12 HOURS
Qty: 12 G | Refills: 11 | Status: SHIPPED | OUTPATIENT
Start: 2019-07-26

## 2019-07-27 RX ORDER — FLUTICASONE PROPIONATE 220 UG/1
AEROSOL, METERED RESPIRATORY (INHALATION)
Qty: 12 G | Refills: 11 | Status: SHIPPED | OUTPATIENT
Start: 2019-07-27

## 2019-08-22 ENCOUNTER — PATIENT MESSAGE (OUTPATIENT)
Dept: UROLOGY | Facility: CLINIC | Age: 64
End: 2019-08-22

## 2019-08-22 DIAGNOSIS — R39.198 SLOW URINARY STREAM: Primary | ICD-10-CM

## 2019-09-09 ENCOUNTER — HOSPITAL ENCOUNTER (OUTPATIENT)
Dept: UROLOGY | Facility: HOSPITAL | Age: 64
Discharge: HOME | End: 2019-09-09
Attending: UROLOGY
Payer: MEDICAID

## 2019-09-09 VITALS
DIASTOLIC BLOOD PRESSURE: 73 MMHG | WEIGHT: 141.13 LBS | HEIGHT: 66 IN | BODY MASS INDEX: 22.68 KG/M2 | RESPIRATION RATE: 17 BRPM | HEART RATE: 61 BPM | SYSTOLIC BLOOD PRESSURE: 137 MMHG | TEMPERATURE: 98 F

## 2019-09-09 DIAGNOSIS — R39.198 SLOW URINARY STREAM: ICD-10-CM

## 2019-09-09 PROCEDURE — 53660 DILATION OF URETHRA: CPT

## 2019-09-09 PROCEDURE — 52000 CYSTOURETHROSCOPY: CPT

## 2019-09-09 PROCEDURE — 52281 CYSTOSCOPY AND TREATMENT: CPT

## 2019-09-09 PROCEDURE — 52000 PR CYSTOURETHROSCOPY: ICD-10-PCS | Mod: ,,, | Performed by: UROLOGY

## 2019-09-09 PROCEDURE — 52000 CYSTOURETHROSCOPY: CPT | Mod: ,,, | Performed by: UROLOGY

## 2019-09-09 RX ORDER — AMOXICILLIN 500 MG/1
500 CAPSULE ORAL ONCE
Status: COMPLETED | OUTPATIENT
Start: 2019-09-09 | End: 2019-09-09

## 2019-09-09 RX ORDER — LIDOCAINE HYDROCHLORIDE 20 MG/ML
JELLY TOPICAL ONCE
Status: COMPLETED | OUTPATIENT
Start: 2019-09-09 | End: 2019-09-09

## 2019-09-09 RX ADMIN — AMOXICILLIN 500 MG: 500 CAPSULE ORAL at 09:09

## 2019-09-09 RX ADMIN — LIDOCAINE HYDROCHLORIDE: 20 JELLY TOPICAL at 09:09

## 2019-09-09 NOTE — PROCEDURES
CYSTOSCOPY REPORT    Pre Procedure Diagnosis:  Slow urinary stream    Post Procedure Diagnosis:  Urethral stenosis    Procedure:  Urethral dilation from 16 Fr to 20 Fr, sequentially, and cystourethroscopy    Anesthesia: 10 cc 2% lidocaine jelly applied per urethra.    14 FR Flexible Olympus cystoscope used.    FINDINGS:  Dome, anterior, posterior, lateral walls and bladder base free of urothelial abnormalities. Right and left ureteral orifices in the normal postion and configuration, both effluxed clear urine.  Bladder neck was normal.  The urethra showed a soft stenotic area on withdrawal of the scope.    Specimen:  None    NOTE:  The patient stated it was easier to urinate but not back to her baseline.  May have been because the bladder was not as full as it normally is.    She will see how she does over the next several days and send a message.  If it remains slow, consider UDS.     The patient was taken to the cystoscopy suite and placed in dorsal lithotomy position.  The genitalia was prepped and draped  in the usual sterile fashion.  Two percent lidocaine jelly was inserted in the urethra.  After sufficent time had passed to allow good local anesthesia, the cystoscope was inserted in the urethra and passed into the bladder visualizing the urethra along its entire course.  The dome, anterior, posterior and lateral walls were examined systematically.  The ureteral orifices were in their usual position and configuration.  The cystoscope was turned upon itself 180 degrees to visualize the bladder neck.  The cystoscope was then brought to the level of the bladder neck, the water was turned on and the urethra was visualized.  The cystoscope was removed and the patient was instructed to urinate prior to leaving the office.     Post procedure medication:  Amoxicillin 500 mg x 1     ASSESSMENT/PLAN:  64 year old woman status post flexible cystoscopy.  1. Push fluids for 24 hours.  2. May see blood in the urine, this  should gradually improve over the next 2-3 days.  3. The patient was instructed to return to the office or go to the emergency should fever, chills, cloudy urine, or inability to urinate develop.  4. Follow up as per the NOTE above.

## 2019-09-09 NOTE — H&P
CHIEF COMPLAINT:     Mrs. Katz is a 63 y.o. female presenting as a self referred patient.  Incomplete bladder emptying, feels like the urethra is closing     PRESENTING ILLNESS:     Carley Katz is a 63 y.o. female who states that she feels like her urethra is closing.  Sometimes the stream is slow and sprays in different directions.  She has not had any urethral surgery (hysterectomy only) and no XRT to the pelvis.  She also has suprapubic tenderness, pressure that is not exacerbated by anything but she states that Tylenol sometimes relieves it.  She urinates several times in the am and before she takes her HCTZ but after that she voids 6-8 times before bedtime.  She also has nocturia x 3-4.  She states her urinary symptoms started about 1 1/2 years ago after she had a Qtip evaluation at [facility] by her OBGYN.     , hysterectomy for bleeding 10-15 years ago, sexually active no pain, bowels are normal.      She presents for cystoscopy to evaluate the slow urinary stream.     REVIEW OF SYSTEMS:    Review of Systems   Respiratory: Positive for cough.    Genitourinary: Positive for frequency and urgency.        Slow urinary stream   All other systems reviewed and are negative.      PATIENT HISTORY:    Past Medical History:   Diagnosis Date    Hypertension     Lung disease        Past Surgical History:   Procedure Laterality Date    HYSTERECTOMY  2004    LUNG BIOPSY      OOPHORECTOMY         Family History   Problem Relation Age of Onset    Lung cancer Father     COPD Mother     Hypertension Mother     Atrial fibrillation Mother     Breast cancer Sister      Socioeconomic History    Marital status:    Tobacco Use    Smoking status: Never Smoker    Smokeless tobacco: Never Used    Tobacco comment: Father smoked   Substance and Sexual Activity    Alcohol use: Not Currently     Alcohol/week: 0.0 oz    Drug use: Never    Sexual activity: Yes     Partners: Male     Birth  control/protection: None       Allergies:  Albuterol; Ciprofloxacin; Codeine; Ciprofloxacin (bulk); Influenza virus vaccines; Latex, natural rubber; and Peanut    Medications:  [unfilled]      PHYSICAL EXAMINATION:    The patient generally appears in good health, is appropriately interactive, and is in no apparent distress.    Skin: No lesions.    Mental: Cooperative with normal affect.    Neuro: Grossly intact.    HEENT: Normal. No evidence of lymphadenopathy.    Chest:  normal inspiratory effort.    Abdomen:  Soft, non-tender. No masses or organomegaly. Bladder is not palpable. No evidence of flank discomfort. No evidence of inguinal hernia.    Extremities: No clubbing, cyanosis, or edema      LABS:    Lab Results   Component Value Date    BUN 17 02/07/2018    CREATININE 0.7 02/07/2018           IMPRESSION:    Slow urinary stream    PLAN:    1. For flexible cystourethroscopy.

## 2019-09-09 NOTE — PATIENT INSTRUCTIONS
What to Expect After a Cystoscopy  For the next 24-48 hours, you may feel a mild burning when you urinate. This burning is normal and expected. Drink plenty of water to dilute the urine to help relieve the burning sensation. You may also see a small amount of blood in your urine after the procedure.    Unless you are already taking antibiotics, you may be given an antibiotic after the test to prevent infection.    Signs and Symptoms to Report  Call the [facility] at [phone] if you develop any of the following:  · Fever of 101 degrees or higher  · Chills or persistent bleeding  · Inability to urinate

## 2019-09-11 DIAGNOSIS — R39.198 SLOW URINARY STREAM: Primary | ICD-10-CM

## 2019-09-11 RX ORDER — LIDOCAINE HYDROCHLORIDE 20 MG/ML
JELLY TOPICAL ONCE
Status: CANCELLED | OUTPATIENT
Start: 2019-09-11 | End: 2019-09-11

## 2019-09-11 RX ORDER — DOXYCYCLINE HYCLATE 100 MG
100 TABLET ORAL ONCE
Status: CANCELLED | OUTPATIENT
Start: 2019-09-11 | End: 2019-09-11

## 2019-09-11 NOTE — PROGRESS NOTES
Patient showed up in the lobby stating that her stream is not markedly better.  She has to lie down to have an improvement.    Orders placed for urodynamics.

## 2019-09-11 NOTE — PATIENT INSTRUCTIONS
Urodynamics Studies     The bladder holds urine until it leaves the body through the urethra.     Urodynamics studies are a series of tests that give your doctor a close look at the working of your bladder and urethra. The tests can help your doctor learn about any problems storing urine or voiding (eliminating) urine from your body.  Understanding the lower urinary tract  The lower part of the urinary tract has several parts.  · The bladder stores urine until youre ready to release it.  · The urethra is the tube that carries urine from the bladder out of the body.  · The sphincter is made up of muscles around the opening of the bladder. The sphincter muscles tighten to hold urine in the bladder. They relax to let urine flow. Signals from the brain tell the sphincter when to tighten and relax. These signals also tell the bladder when to contract to let urine flow out of the body.  Why you need a urodynamics study  This test may be ordered if you:  · Are incontinent (leak urine)  · Have a bladder that does not empty all the way.  · Have symptoms such as the need to urinate often or a constant strong need to urinate  · Have intermittent or weak urine stream  · Have persistent urinary tract infections  Preparing for the study  · Tell your doctor about any medicine youre taking. Ask if you should stop them before the study.  · Keep a diary of your bathroom habits. Do this for a few days before the study. This diary can be a helpful part of the evaluation.  · Ask if you need to arrive for the study with a full bladder.  Date Last Reviewed: 1/1/2017  © 7854-3463 [employer]. [address] [place] [address], [place] [address]. All rights reserved. This information is not intended as a substitute for professional medical care. Always follow your healthcare professional's instructions.          Urodynamic Studies     The equipment used for the study varies depending upon the facility and what tests are done.      Urodynamic studies may be done in your doctors office, a clinic, or a hospital. The studies may take up to an hour or more. This depends on which tests your doctor does. The tests are generally painless. You wont need sedating medicine.  Tests that may be done  Uroflowmetry. This measures the amount and speed of urine you void from your bladder. You urinate into a funnel. Its attached to a computer that records your urine flow over time. The amount of urine left in your bladder after you void may also be measured right after this test.  Cystometry. This test evaluates how much your bladder can hold. It also measures how strong your bladder muscle is and how well the signals work that tell you when your bladder is full. Your healthcare provider fills your bladder with sterile water or saline solution, through a catheter. Your doctor will instruct you to report any sensations you feel. Mention if theyre similar to symptoms youve felt at home. Your doctor may ask you to cough, stand and walk, or bear down during this test.  Electromyogram. This helps evaluate the muscle contractions that control urination, such as sphincter muscle contractions. Your healthcare provider may place electrode patches or wires near your rectum or urethra to make the recording. He or she may ask you to try to tighten or relax your sphincter muscles during this test.  Pressure flow study. This test measures your detrusor, urethral, and abdominal pressures. Detrusor is the muscle surrounding the bladder walls that relaxes to allow your bladder to fill, and and contracts to squeeze out urine. A pressure flow study is often done after cystometry. Youre asked to urinate while a probe in your urethra measures pressures.  Video cystourethrography. This takes video pictures of urine flow through your urinary tract. It can help identify blockages or other problems. The bladder is filled with an X-ray contrast fluid. Then X-ray video  pictures are taken as the fluid is urinated out. Ultrasound imaging may also be combined with routine urodynamic studies.  Ambulatory urodynamics. This test can be used to evaluate you while doing usual activities.  Getting your results  After the study, youll get dressed and return to the consultation room. Test results may be ready soon after the study is finished. Or, you may return to your doctors office in a few days for your results. Your doctor can talk with you about the study report and your options.   Date Last Reviewed: 1/1/2017  © 1015-4728 [employer]. [address] [place] [address]. All rights reserved. This information is not intended as a substitute for professional medical care. Always follow your healthcare professional's instructions.

## 2019-09-25 ENCOUNTER — TELEPHONE (OUTPATIENT)
Dept: UROLOGY | Facility: CLINIC | Age: 64
End: 2019-09-25

## 2019-09-25 ENCOUNTER — PROCEDURE VISIT (OUTPATIENT)
Dept: UROLOGY | Facility: CLINIC | Age: 64
End: 2019-09-25
Payer: MEDICAID

## 2019-09-25 VITALS
DIASTOLIC BLOOD PRESSURE: 70 MMHG | WEIGHT: 138 LBS | TEMPERATURE: 97 F | BODY MASS INDEX: 22.18 KG/M2 | HEIGHT: 66 IN | SYSTOLIC BLOOD PRESSURE: 145 MMHG | HEART RATE: 50 BPM

## 2019-09-25 DIAGNOSIS — R10.2 PELVIC PAIN: Primary | ICD-10-CM

## 2019-09-25 DIAGNOSIS — M62.89 PELVIC FLOOR DYSFUNCTION: ICD-10-CM

## 2019-09-25 DIAGNOSIS — R39.198 SLOW URINARY STREAM: ICD-10-CM

## 2019-09-25 PROCEDURE — 51728 PR COMPLEX CYSTOMETROGRAM VOIDING PRESSURE STUDIES: ICD-10-PCS | Mod: 26,S$PBB,, | Performed by: UROLOGY

## 2019-09-25 PROCEDURE — 51784 PR ANAL/URINARY MUSCLE STUDY: ICD-10-PCS | Mod: 26,S$PBB,51, | Performed by: UROLOGY

## 2019-09-25 PROCEDURE — 51741 PR UROFLOWMETRY, COMPLEX: ICD-10-PCS | Mod: 26,S$PBB,51, | Performed by: UROLOGY

## 2019-09-25 PROCEDURE — 51736 URINE FLOW MEASUREMENT: CPT | Mod: PBBFAC | Performed by: UROLOGY

## 2019-09-25 PROCEDURE — 51741 ELECTRO-UROFLOWMETRY FIRST: CPT | Mod: PBBFAC | Performed by: UROLOGY

## 2019-09-25 PROCEDURE — 51728 CYSTOMETROGRAM W/VP: CPT | Mod: PBBFAC | Performed by: UROLOGY

## 2019-09-25 PROCEDURE — 51784 ANAL/URINARY MUSCLE STUDY: CPT | Mod: 26,S$PBB,51, | Performed by: UROLOGY

## 2019-09-25 PROCEDURE — 51741 ELECTRO-UROFLOWMETRY FIRST: CPT | Mod: 26,S$PBB,51, | Performed by: UROLOGY

## 2019-09-25 PROCEDURE — 51797 INTRAABDOMINAL PRESSURE TEST: CPT | Mod: 26,S$PBB,, | Performed by: UROLOGY

## 2019-09-25 PROCEDURE — 51797 INTRAABDOMINAL PRESSURE TEST: CPT | Mod: PBBFAC | Performed by: UROLOGY

## 2019-09-25 PROCEDURE — 51797 PR VOIDING PRESS STUDY INTRA-ABDOMINAL VOID: ICD-10-PCS | Mod: 26,S$PBB,, | Performed by: UROLOGY

## 2019-09-25 PROCEDURE — 51728 CYSTOMETROGRAM W/VP: CPT | Mod: 26,S$PBB,, | Performed by: UROLOGY

## 2019-09-25 PROCEDURE — 51784 ANAL/URINARY MUSCLE STUDY: CPT | Mod: PBBFAC | Performed by: UROLOGY

## 2019-09-25 RX ORDER — DOXYCYCLINE HYCLATE 100 MG
100 TABLET ORAL ONCE
Status: DISCONTINUED | OUTPATIENT
Start: 2019-09-25 | End: 2019-09-25 | Stop reason: CLARIF

## 2019-09-25 RX ADMIN — Medication 100 MG: at 01:09

## 2019-09-25 NOTE — PATIENT INSTRUCTIONS
SIMPLE URODYNAMIC STUDY (SUDS) DISCHARGE INSTRUCTIONS    You have had a procedure that will require time to properly heal. Follow the instructions you have been given on how to care for yourself once you are home. Below is additional information to help in your recovery.    ACTIVITY  · There are no restrictions in activity. Start doing again the things you did before the procedure.  · You may experience a slight burning sensation. You may notice a small amount of blood in your urine. This will clear up within a day. Call the clinic if this continues beyond 48 hours.     DIET  · Continue your normal diet. You may eat the same foods you ate before your procedure.  · Drink plenty of fluids during the first 24-48 hours following your procedure.     MEDICATIONS  · Resume all other previous medications from your prescribing physician.  · Continue any pre-procedure antibiotics until they are all gone.     SIGNS AND SYMPTOMS TO REPORT TO THE DOCTOR  · Chills or fever greater than 101° F within 24 hours of procedure.  · Changes in urination, such as increased bleeding, foul smell, cloudy urine, or painful urination.  · Call your doctor with any questions or concerns.     For any emergency situation, call [phone] immediately or go to your nearest emergency room.     [facility]  [phone]      _                                                                                                                                                                                             If any problems after hours or weekends, you may call [phone] and ask for the urology resident on call.

## 2019-09-25 NOTE — PROCEDURES
Procedures     Urodynamic Report    Indication:  Slow urinary stream, incomplete bladder emptying    Patient was taken to the Urodynamic Suite with a comfortably full bladder and asked to perform a free uroflow.  Next, the patient was prepped and the urinary residual was drained with a 14 Fr catheter.  A 7 Fr dual lumen catheter was placed to measure intravesical pressures.  A 10 Fr balloon manometer was placed into the rectum for abdominal pressure measurements.  Patch EMG electrodes were placed on the perineum.  The patient was connected to the [employer] Urodynamic machine, using a multichannel technique, the data were interpreted.  The bladder was filled with sterile water at room temperature at a rate of 30 ml/min.  Patient is filled to urgency.  Filling is performed with the patient in the seated position.  Abdominal leak point pressures are checked at 1st desire, then serially at 50cc increments first with Valsalva then with coughing.  The patient was then asked to sit and void for a pressure flow study.    The following are the results of the study:  1.  Uroflow       Q max:  46.1 ml/sec       Voided volume:  125 ml       Pattern of the curve:  biphasic    2.  PVR:  20 ml    3.  CMG       Sensation:         First Desire:  96.3 ml         Normal Desire:  235.1 ml         Strong Desire:  304.1 ml         Urgency:  441.8 ml       Capacity:  486.8 ml       Abnormal Contractions:  none       Compliance:  normal    4.  Abdominal Leak Point Pressure:       Valsalva:  80.2 cm H2O       Cough:  106.6 cm H2O    5.  EMG:  Normal guarding, increased during voiding    6.  Voiding phase       Q max:  34.9 ml/sec       P det at Q max:  22.7 cm H2O       Pattern of the curve:  Intermittent with the carmel above baseline       Voided volume:  466.8 ml       PVR:  20 ml    7.  Analysis:  Normal sensation, bladder capacity, dysfunctional voider, empties well    8.  Recommendations:       A.  Note:  She states that after the  cystoscopy, she feels like she is sitting on a rock.  Examination of the pelvic floor reveals that she has tenderness and a tight band along the levator ani muscle and also the obturator internus.         B.  Consented for Botox injection of the levator ani muscles with local anesthetic.  At this point, she is unlikely to be able to tolerate physical therapy.        C.  Discussed that I plan to follow this with PT to help with her discomfort and also the voiding symptoms.

## 2019-09-26 DIAGNOSIS — Z01.818 PREOPERATIVE TESTING: Primary | ICD-10-CM

## 2021-01-05 ENCOUNTER — PATIENT MESSAGE (OUTPATIENT)
Dept: PULMONOLOGY | Facility: CLINIC | Age: 66
End: 2021-01-05

## 2021-01-13 ENCOUNTER — PATIENT MESSAGE (OUTPATIENT)
Dept: PULMONOLOGY | Facility: CLINIC | Age: 66
End: 2021-01-13

## 2021-05-12 ENCOUNTER — PATIENT MESSAGE (OUTPATIENT)
Dept: RESEARCH | Facility: HOSPITAL | Age: 66
End: 2021-05-12

## 2022-09-15 DIAGNOSIS — K57.92 DIVERTICULITIS: Primary | ICD-10-CM
